# Patient Record
Sex: FEMALE | URBAN - METROPOLITAN AREA
[De-identification: names, ages, dates, MRNs, and addresses within clinical notes are randomized per-mention and may not be internally consistent; named-entity substitution may affect disease eponyms.]

---

## 2022-09-29 ENCOUNTER — TELEPHONE (OUTPATIENT)
Facility: CLINIC | Age: 62
End: 2022-09-29

## 2022-09-29 NOTE — TELEPHONE ENCOUNTER
Daughter answered pt's phone and stated that a new order was already faxed to [facility] and she will be seeing a MD at [facility] as [facility] does not accept pt's insurance.

## 2022-09-29 NOTE — TELEPHONE ENCOUNTER
Message left on [facility] voicemail that patient needs a new order faxed for testing to new place that takes her insurance since [facility] does not.   Did not leave name of test or facility that she wants to have it done at